# Patient Record
Sex: FEMALE | URBAN - METROPOLITAN AREA
[De-identification: names, ages, dates, MRNs, and addresses within clinical notes are randomized per-mention and may not be internally consistent; named-entity substitution may affect disease eponyms.]

---

## 2023-01-09 ENCOUNTER — APPOINTMENT (RX ONLY)
Dept: URBAN - METROPOLITAN AREA CLINIC 12 | Facility: CLINIC | Age: 28
Setting detail: DERMATOLOGY
End: 2023-01-09

## 2023-01-09 DIAGNOSIS — Z41.1 ENCOUNTER FOR COSMETIC SURGERY: ICD-10-CM

## 2023-01-09 PROCEDURE — ? PATIENT SPECIFIC COUNSELING

## 2023-01-09 PROCEDURE — ? CONSULTATION - BREAST (COSMETIC)

## 2023-01-09 NOTE — PROCEDURE: CONSULTATION - BREAST (COSMETIC)
Detail Level: Detailed
Consultation Charge $ (Use Numbers Only, No Text Please.): 0.00
Count Minor/Major Decisions Toward Mdm (Not Cosmetic)?: No

## 2023-01-09 NOTE — PROCEDURE: PATIENT SPECIFIC COUNSELING
Other (Free Text): Patient present today for breast augmentation consult with Dr. Broussard. Patient reports she is a  and currently ways 190 pounds, but ideal weight while not computing is around 180 pounds. Patient reports she is currently a cup size C, and would like to be a more dalal cup size C. Patient reports she hasn’t previously had breast surgery, and has no problems with scarring or healing. Dr. Broussard examined patient and reviewed all patient medical history. Dr. Broussard explained different types of implants including saline and gel and incision patterns. Patient verbalized preference of gel implants and incision site below breast. Dr. Broussard also discussed with patient placement of implant above vs below muscle. Dr. Broussard examined patient and took measurements voiced patient has decent amount of breast tissue currently. Dr. Broussard voiced the fullness can be obtained with an insert of implant alone, however, a lift may be needed to help support. Dr. Broussard performed skin pinch test and voiced best option would be placing implant below muscle. Patient verbalized agreement of all risk, surgery, and healing factors. Patient voiced she will think about surgery and discuss with mother. Sonam will quote patient. \\n\\n\\n\\nR base width 12.5\\nL base width 12.5 \\nR sternum to nipple 24\\nL sternum to nipple 24 \\nInternal nipple 24\\nNipple to IMF 7 on right and 8 on left \\nSkin pinch 1 cm bilateral\\n\\n\\nDrIsabella Broussard addendum:\\nPatient seen in clinic 1/9/23\\nPatient p/f breast augmentation surgery. 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 Currently she is bulking, weighs 190lbs (height 5'7\"). Can be as low as 160lbs and her ideal weight is around 180lbs.\\nShe wears a C cup and wants to achieve a dalal C cup and a lifted appearance, with more volume and fullness in the upper pole and medial cleavage. She does not want to be too big. Has done some research of implants but not too much.\\n\\nPMH: denies\\n\\nPSH: tonsil surgery\\n\\nMeds: denies\\n\\nExam as above. Thin skin with stretch marks in upper pole, skin pinch 1cm bilateral, deflated breast parenchyma. R IMF is lower than L.\\nGrade 1 ptosis R, Grade 2 ptosis on L\\nVery well developed pec major muscle bilateral.\\n\\nAssessment:\\nPatient would be a good candidate for a dual plane breast augmentation with smooth silicone gel implants, possible mastopexy to put NAC in appropriate position bilateral.\\nPhotographs taken\\nWill send quote.\\n\\nD/W Dr. De La Torre and Dr. Jamil Montes\\n\\Kerry Broussard
Detail Level: Simple